# Patient Record
Sex: FEMALE
[De-identification: names, ages, dates, MRNs, and addresses within clinical notes are randomized per-mention and may not be internally consistent; named-entity substitution may affect disease eponyms.]

---

## 2018-01-09 ENCOUNTER — HOSPITAL ENCOUNTER (EMERGENCY)
Dept: HOSPITAL 5 - ED | Age: 53
End: 2018-01-09
Payer: COMMERCIAL

## 2018-01-09 ENCOUNTER — HOSPITAL ENCOUNTER (INPATIENT)
Dept: HOSPITAL 5 - ED | Age: 53
LOS: 2 days | Discharge: HOME | DRG: 206 | End: 2018-01-11
Attending: INTERNAL MEDICINE | Admitting: INTERNAL MEDICINE
Payer: COMMERCIAL

## 2018-01-09 DIAGNOSIS — R55: ICD-10-CM

## 2018-01-09 DIAGNOSIS — Z98.84: ICD-10-CM

## 2018-01-09 DIAGNOSIS — M94.0: Primary | ICD-10-CM

## 2018-01-09 DIAGNOSIS — I10: ICD-10-CM

## 2018-01-09 DIAGNOSIS — E11.9: ICD-10-CM

## 2018-01-09 DIAGNOSIS — R07.9: Primary | ICD-10-CM

## 2018-01-09 DIAGNOSIS — E78.00: ICD-10-CM

## 2018-01-09 DIAGNOSIS — Z53.21: ICD-10-CM

## 2018-01-09 LAB
APTT BLD: 26.4 SEC. (ref 24.2–36.6)
BASOPHILS # (AUTO): 0 K/MM3 (ref 0–0.1)
BASOPHILS NFR BLD AUTO: 0.3 % (ref 0–1.8)
BUN SERPL-MCNC: 17 MG/DL (ref 7–17)
BUN/CREAT SERPL: 17 %
CALCIUM SERPL-MCNC: 9.6 MG/DL (ref 8.4–10.2)
EOSINOPHIL # BLD AUTO: 0 K/MM3 (ref 0–0.4)
EOSINOPHIL NFR BLD AUTO: 0.2 % (ref 0–4.3)
HCT VFR BLD CALC: 44.5 % (ref 30.3–42.9)
HEMOLYSIS INDEX: 9
HGB BLD-MCNC: 15.3 GM/DL (ref 10.1–14.3)
INR PPP: 1.02 (ref 0.87–1.13)
LYMPHOCYTES # BLD AUTO: 2.5 K/MM3 (ref 1.2–5.4)
LYMPHOCYTES NFR BLD AUTO: 19 % (ref 13.4–35)
MAGNESIUM SERPL-MCNC: 2.1 MG/DL (ref 1.7–2.3)
MCH RBC QN AUTO: 29 PG (ref 28–32)
MCHC RBC AUTO-ENTMCNC: 34 % (ref 30–34)
MCV RBC AUTO: 85 FL (ref 79–97)
MONOCYTES # (AUTO): 0.8 K/MM3 (ref 0–0.8)
MONOCYTES % (AUTO): 6 % (ref 0–7.3)
PLATELET # BLD: 180 K/MM3 (ref 140–440)
RBC # BLD AUTO: 5.21 M/MM3 (ref 3.65–5.03)

## 2018-01-09 PROCEDURE — 85610 PROTHROMBIN TIME: CPT

## 2018-01-09 PROCEDURE — 85025 COMPLETE CBC W/AUTO DIFF WBC: CPT

## 2018-01-09 PROCEDURE — 78452 HT MUSCLE IMAGE SPECT MULT: CPT

## 2018-01-09 PROCEDURE — A9502 TC99M TETROFOSMIN: HCPCS

## 2018-01-09 PROCEDURE — 82550 ASSAY OF CK (CPK): CPT

## 2018-01-09 PROCEDURE — 93005 ELECTROCARDIOGRAM TRACING: CPT

## 2018-01-09 PROCEDURE — 71046 X-RAY EXAM CHEST 2 VIEWS: CPT

## 2018-01-09 PROCEDURE — 93010 ELECTROCARDIOGRAM REPORT: CPT

## 2018-01-09 PROCEDURE — 93017 CV STRESS TEST TRACING ONLY: CPT

## 2018-01-09 PROCEDURE — 36415 COLL VENOUS BLD VENIPUNCTURE: CPT

## 2018-01-09 PROCEDURE — 93306 TTE W/DOPPLER COMPLETE: CPT

## 2018-01-09 PROCEDURE — 83735 ASSAY OF MAGNESIUM: CPT

## 2018-01-09 PROCEDURE — 85379 FIBRIN DEGRADATION QUANT: CPT

## 2018-01-09 PROCEDURE — 80048 BASIC METABOLIC PNL TOTAL CA: CPT

## 2018-01-09 PROCEDURE — 85730 THROMBOPLASTIN TIME PARTIAL: CPT

## 2018-01-09 PROCEDURE — 84484 ASSAY OF TROPONIN QUANT: CPT

## 2018-01-09 PROCEDURE — 82553 CREATINE MB FRACTION: CPT

## 2018-01-09 NOTE — XRAY REPORT
FINAL REPORT



PROCEDURE:  Chest. 



TECHNIQUE:  PA and lateral views.



HISTORY:  Chest pain. 



COMPARISON:  No prior studies are available for comparison.



FINDINGS:  

The heart size is normal. There is mild tortuosity of the

thoracic aorta. The lungs are clear and well expanded. There are

no pleural effusions. The soft tissues and regional skeleton are

unremarkable.



IMPRESSION:  

No evidence of acute disease.

## 2018-01-09 NOTE — EMERGENCY DEPARTMENT REPORT
ED Chest Pain HPI





- General


Chief Complaint: Chest Pain


Stated Complaint: UNK


Time Seen by Provider: 01/09/18 21:17


Source: patient, RN notes reviewed


Mode of arrival: Ambulatory


Limitations: No Limitations





- History of Present Illness


Initial Comments: 





This is a 52-year-old female, the patient is previously unknown to this provider

, her primary care doctor's with the [employer] at work, she endorses a past 

medical history of high cholesterol, diabetes, denies hypertension.  Patient 

presents to the ER with a complaint of chest tightness, diaphoresis, shortness 

of breath, lightheadedness, near syncope.  Symptoms are intermittent for the 

past couple days.  The chest tightness does not radiate anywhere.  Patient 

reports recent road trip to [place].  Patient also works as a school bus 

.  No recent stress test.  no recent echocardiogram.


MD Complaint: chest pain


-: Gradual


Onset: during rest


Pain Location: substernal


Pain Radiation: none


Severity: mild


Severity scale (0 -10): 3


Quality: tightness, aching


Consistency: intermittent


Improves With: nothing


Worsens With: nothing


Context: recent travel


re: diaphoresis, dyspnea


Other Symptoms: palpitations.  denies: cough, fever, syncope, rash, acid taste 

in mouth, leg swelling


Aspirin use within the Past 7 Days: (1) Yes





- Related Data


On Oral Contraceptives: No


 Home Medications











 Medication  Instructions  Recorded  Confirmed  Last Taken


 


Hydrochlorothiazide [HCTZ] 25 mg PO QDAY 01/09/18 01/09/18 Unknown


 


Oxybutynin Chloride [Oxybutynin 5 mg PO QDAY 01/09/18 01/09/18 Unknown





Chloride ER]    











 Allergies











Allergy/AdvReac Type Severity Reaction Status Date / Time


 


ibuprofen [From Motrin] Allergy  Rash Verified 01/09/18 06:58














Heart Score





- HEART Score


History: Moderately suspicious


EKG: Normal


Age: 45-65


Risk factors: 1-2 risk factors


Troponin: < normal limit


HEART Score: 3





- Critical Actions


Critical Actions: 0-3 pts:0.9-1.7%risk of adverse cardiac event.Candidate for 

discharge





ED Review of Systems


ROS: 


Stated complaint: UNK


Other details as noted in HPI





Constitutional: diaphoresis.  denies: fever


Eyes: denies: vision change


ENT: denies: epistaxis


Respiratory: denies: wheezing


Cardiovascular: chest pain, palpitations


Gastrointestinal: as per HPI


Genitourinary: as per HPI


Musculoskeletal: as per HPI


Skin: as per HPI


Neurological: as per HPI, weakness


Psychiatric: as per HPI





ED Past Medical Hx





- Past Medical History


Hx Hypertension: Yes


Hx Diabetes: Yes (history of diabetes resolved after gastric bypass)


Hx Headaches / Migraines: Yes


Additional medical history: Hypercholesterolemia





- Surgical History


Additional Surgical History: gastric by pass, hysterectomy





- Social History


Smoking Status: Never Smoker


Substance Use Type: None





- Medications


Home Medications: 


 Home Medications











 Medication  Instructions  Recorded  Confirmed  Last Taken  Type


 


Hydrochlorothiazide [HCTZ] 25 mg PO QDAY 01/09/18 01/09/18 Unknown History


 


Oxybutynin Chloride [Oxybutynin 5 mg PO QDAY 01/09/18 01/09/18 Unknown History





Chloride ER]     














ED Physical Exam





- General


Limitations: No Limitations


General appearance: alert, in no apparent distress





- Head


Head exam: Present: atraumatic, normocephalic





- Eye


Eye exam: Present: normal appearance, PERRL, EOMI, other (visual acuity intact 

to finger counting, color perception, reading at a close distance).  Absent: 

nystagmus





- ENT


ENT exam: Present: normal exam, normal orophraynx, mucous membranes moist, 

normal external ear exam





- Neck


Neck exam: Present: normal inspection, full ROM





- Respiratory


Respiratory exam: Present: normal lung sounds bilaterally.  Absent: respiratory 

distress





- Cardiovascular


Cardiovascular Exam: Present: regular rate, normal rhythm, normal heart sounds.

  Absent: systolic murmur, diastolic murmur, rubs, gallop





- GI/Abdominal


GI/Abdominal exam: Present: soft, normal bowel sounds.  Absent: distended, 

tenderness, guarding, rebound, rigid, pulsatile mass





- Extremities Exam


Extremities exam: Present: normal inspection, full ROM, normal capillary 

refill.  Absent: pedal edema, joint swelling, calf tenderness





- Back Exam


Back exam: Present: normal inspection, full ROM.  Absent: tenderness, CVA 

tenderness (R), paraspinal tenderness, vertebral tenderness





- Neurological Exam


Neurological exam: Present: alert, oriented X3, CN II-XII intact, normal gait (

no pass pointing.  Normal heel-to-shin.  Negative Romberg.  Normal gait.  

Normal tandem gait), other (Extraocular movements intact.  Tongue midline.  No 

facial droop.  Facial sensation intact to light touch in the V1, V2, V3 

distribution bilaterally.  5 and 5 strength in 4 extremities..  Sensation is 

intact to light touch in 4 extremities.).  Absent: motor sensory deficit





- Psychiatric


Psychiatric exam: Present: normal affect, normal mood





- Skin


Skin exam: Present: warm, dry, intact, normal color.  Absent: rash





ED Course


 Vital Signs











  01/09/18 01/09/18 01/09/18





  19:07 21:35 22:00


 


Temperature 98.5 F 98.3 F 


 


Pulse Rate 69 86 64


 


Respiratory 18 18 10 L





Rate   


 


Blood Pressure 129/88  121/86


 


Blood Pressure  126/82 





[Left]   


 


O2 Sat by Pulse 96 99 100





Oximetry   














  01/09/18 01/09/18 01/09/18





  22:30 22:31 23:00


 


Temperature   


 


Pulse Rate 72 74 86


 


Respiratory 13 13 18





Rate   


 


Blood Pressure 129/87 129/87 126/91


 


Blood Pressure   





[Left]   


 


O2 Sat by Pulse 98 99 98





Oximetry   














  01/10/18 01/10/18





  00:00 01:00


 


Temperature  


 


Pulse Rate 82 84


 


Respiratory 18 15





Rate  


 


Blood Pressure 137/82 105/68


 


Blood Pressure  





[Left]  


 


O2 Sat by Pulse 98 98





Oximetry  














MAYRA score





- Mayra Score


Age > 65: (0) No


Aspirin use within the Past 7 Days: (1) Yes


3 or more CAD Risk Factors: (0) No


2 or more Angina events in past 24 hrs: (1) Yes


Known CAD with more than 50% Stenosis: (0) No


Elevated Cardiac Markers: (0) No


ST Deviation Greater than 0.5mm: (0) No


MAYRA Score: 2





ED Medical Decision Making





- Lab Data








 Vital Signs - 24 hr











  01/09/18 01/09/18 01/09/18





  19:07 22:00 22:30


 


Temperature 98.5 F  


 


Pulse Rate 69 64 72


 


Respiratory 18 10 L 13





Rate   


 


Blood Pressure 129/88 121/86 129/87


 


O2 Sat by Pulse 96 100 98





Oximetry   














 Lab Results











  01/09/18 01/09/18 Range/Units





  21:59 21:59 


 


PT  13.9   (12.2-14.9)  Sec.


 


INR  1.02   (0.87-1.13)  


 


APTT  26.4   (24.2-36.6)  Sec.


 


D-Dimer  149.91   (0-234)  ng/mlDDU


 


Magnesium   2.10  (1.7-2.3)  mg/dL


 


Troponin T   < 0.010  (0.00-0.029)  ng/mL














- EKG Data


-: EKG Interpreted by Me





- EKG Data





01/09/18 22:47


Sinus, 64 bpm, normal axis, normal intervals, morphological consistent with 

STEMI.  Prior to EKG is also unremarkable.





- Radiology Data


Radiology results: image reviewed


interpreted by me: 





X-ray of the chest, interpreted by myself: No acute disease





- Medical Decision Making





Differential diagnosis, including without limited to, orthostasis, vagal event, 

acute coronary syndrome, pneumonia, pulmonary embolus





Assessment and plan: 52-year-old female, low risk by well's criteria, with a 

negative d-dimer, with chest tightness, shortness of breath, diaphoresis and 

near syncope. 











I contacted the [facility] and discussed the case with their physician, Dr. Brunilda Muniz.  She indicates that they cannot accommodate the patient closely 

as an outpatient for cardiac risk stratification, and she authorized the 

patient to be admitted to this hospital for cardiac risk stratification.  She 

does report that the patient had a cardiac Holter monitor acquired in October 

which demonstrated a number of PVCs.  Case was presented to the Hospital 

physician, Dr. Stacy, who accepted the patient to the medical service for 

cardiac risk stratification.


Critical care attestation.: 


If time is entered above; I have spent that time in minutes in the direct care 

of this critically ill patient, excluding procedure time.








ED Disposition


Clinical Impression: 


 Chest pain, Near syncope





Disposition: DC-09 OP ADMIT IP TO THIS HOSP


Is pt being admited?: Yes


Does the pt Need Aspirin: Yes


Condition: Good

## 2018-01-10 RX ADMIN — HEPARIN SODIUM SCH UNIT: 5000 INJECTION, SOLUTION INTRAVENOUS; SUBCUTANEOUS at 22:59

## 2018-01-10 RX ADMIN — ASPIRIN SCH MG: 325 TABLET ORAL at 13:24

## 2018-01-10 RX ADMIN — NITROGLYCERIN SCH: 20 OINTMENT TOPICAL at 01:40

## 2018-01-10 RX ADMIN — NITROGLYCERIN SCH: 20 OINTMENT TOPICAL at 13:26

## 2018-01-10 RX ADMIN — NITROGLYCERIN SCH INCH: 20 OINTMENT TOPICAL at 06:11

## 2018-01-10 RX ADMIN — HEPARIN SODIUM SCH UNIT: 5000 INJECTION, SOLUTION INTRAVENOUS; SUBCUTANEOUS at 13:24

## 2018-01-10 RX ADMIN — NITROGLYCERIN SCH: 20 OINTMENT TOPICAL at 14:00

## 2018-01-10 NOTE — HISTORY AND PHYSICAL REPORT
CHIEF COMPLAINT:  Chest pain.  Other complaint includes irregular heartbeat,

which she described as fluttering.



HISTORY OF PRESENT ILLNESS:  The patient is a 52-year-old female, who states she

has been having chest pain going on for about 4 days.  The pain is in the

retrosternal area and appears as tightness in the chest pain and does not

radiate and is associated with shortness of breath, lightheadedness, and near

syncopal feeling.  Also, the patient said that there was symptom of nausea, but

no vomiting and also describes some fluttering sensation in the chest going on

for quite some time prior to the chest pain and continued with the chest pain. 

There is no history of fever, no history of cough, and no history of

diaphoresis.  The patient was seen in the Emergency Room.



PAST MEDICAL HISTORY:  Pertinent for diabetes mellitus, diet controlled; also

the patient has past history of migraine headache, hypercholesterolemia.



PAST SURGICAL HISTORY:  Pertinent for gastric bypass surgery and hysterectomy.



FAMILY HISTORY:  Pertinent for coronary artery disease in the mother.



SOCIAL HISTORY:  The patient does not smoke, does not drink alcohol and does not

use illicit drugs.



MEDICATIONS:  The patient is on Pepcid 20 mg by mouth daily and Norco 5/325 mg 1

by mouth every 6 hours as needed for pain.



ALLERGIES:  THE PATIENT IS ALLERGIC TO IBUPROFEN.



REVIEW OF SYSTEMS:

CONSTITUTIONAL:  There is no fever, no chills, no diaphoresis.

HEENT:  There is no headache or sore throat.

CARDIOVASCULAR SYSTEM:  Chest pain is present.  No orthopnea.  Palpitation.

RESPIRATORY SYSTEM:  Shortness of breath is present.  No cough.

GASTROINTESTINAL SYSTEM:  There is nausea but no vomiting.  No abdominal pain,

diarrhea or constipation.

NEUROLOGICAL SYSTEM:  There is dizziness and near syncopal feeling with no

change in mental status.

MUSCULOSKELETAL SYSTEM:  There is no joint pain or swelling.

DERMATOLOGICAL SYSTEM:  There is no skin rash or itching.

GENITOURINARY SYSTEM:  There are no dysuria, hematuria or flank pain.  Rest of

system review is normal.



PHYSICAL EXAMINATION:

GENERAL:  At the time of exam, the patient was found to be alert, oriented x 3,

and not in acute distress.

VITAL SIGNS:  Shows normal temperature with pulse of 82, respiration of 18,

blood pressure 137/82, and O2 sat of 98% on room air.

HEENT:  Showed pupils to be equal, round, and reactive to light and

accommodative.  Extraocular muscles are intact.

NECK:  Supple with no JVD or carotid bruit.

CARDIOVASCULAR:  Showed normal first and second heart sounds with some skipped

beats, with no murmurs.

RESPIRATORY SYSTEM:  Show good air entry on both sides of the lung with no

abnormal breath sounds.

GASTROINTESTINAL SYSTEM:  Show abdomen to be full, soft, nontender with no

organomegaly or rigidity.

NEUROLOGIC:  Shows no focal deficit.

MUSCULOSKELETAL SYSTEM:  Show no joint swelling or tenderness.

DERMATOLOGICAL SYSTEM:  Show no skin rash.

GENITOURINARY SYSTEM:  Showing no costovertebral angle tenderness.



PERTINENT LABORATORY DATA AND IMAGING STUDIES:  The patient had a chest x-ray

done that shows no evidence of cardiopulmonary lesion.  The patient's lab tests

show unremarkable cardiac enzymes and normal lab results.



DIAGNOSES:

1.  Chest pain.

2.  Irregular heartbeat.



PLAN:  The patient will be admitted as inpatient to telemetry and we would have

cardiac enzymes checked q.6 hours x 2 more levels.  The patient will be n.p.o.

for Lexiscan stress test in the morning and will have 2D echo done in the

morning to also check the heart for irregular heartbeat.  The patient will be on

nitro paste 1 inch to anterior chest wall q.6 hours and will be on aspirin 325

mg by mouth daily and Tylenol 650 mg by mouth every _____ for fever and

headache.  The patient will be on sublingual nitroglycerin 0.4 mg q.5 minutes

for breakthrough chest pain.  The patient will be on IV morphine 2 mg every 5

minutes as needed for chest pain and she would be on aspirin 325 mg by mouth

daily.  The patient will also be on oxygen by nasal cannula at 2 liter per

minute.  The patient's home medications will be reconciled and started

accordingly.





DD: 01/10/2018 02:14

DT: 01/10/2018 03:05

JOB# [id number]  [id number]

OCN/NTS

## 2018-01-10 NOTE — PROGRESS NOTE
Assessment and Plan


Assessment and plan: 





Chest pain.  Continued chest pain pathway and follow-up stress thallium and 

echocardiogram results.





Hypertension.  Resume antihypertensive medications.





Hypercholesterolemia.





History


Interval history: 





Patient seen in the stress lab.  Patient currently denies any chest pain, 

shortness of breath.





Hospitalist Physical





- Constitutional


Vitals: 


 











Temp Pulse Resp BP Pulse Ox


 


 98.1 F   70   20   109/76   97 


 


 01/10/18 06:06  01/10/18 06:11  01/10/18 06:06  01/10/18 06:11  01/10/18 06:06











General appearance: Present: no acute distress, well-nourished





- EENT


Eyes: Present: PERRL, EOM intact


ENT: hearing intact, clear oral mucosa, dentition normal





- Neck


Neck: Present: supple, normal ROM





- Respiratory


Respiratory effort: normal


Respiratory: bilateral: CTA





- Cardiovascular


Rhythm: regular


Heart Sounds: Present: S1 & S2.  Absent: gallop, rub





- Extremities


Extremities: no ischemia, No edema, Full ROM





- Abdominal


General gastrointestinal: soft, non-tender, non-distended, normal bowel sounds





- Integumentary


Integumentary: Present: clear, warm, dry





- Neurologic


Neurologic: CNII-XII intact, moves all extremities





Results





- Labs


Labs: 


 Laboratory Last Values











PT  13.9 Sec. (12.2-14.9)   01/09/18  21:59    


 


INR  1.02  (0.87-1.13)   01/09/18  21:59    


 


APTT  26.4 Sec. (24.2-36.6)   01/09/18  21:59    


 


D-Dimer  149.91 ng/mlDDU (0-234)   01/09/18  21:59    


 


Magnesium  2.10 mg/dL (1.7-2.3)   01/09/18  21:59    


 


Total Creatine Kinase  76 units/L ()   01/10/18  06:56    


 


CK-MB (CK-2)  1.2 ng/mL (0.0-4.0)   01/10/18  06:56    


 


CK-MB (CK-2) Rel Index  1.5  (0-4)   01/10/18  06:56    


 


Troponin T  < 0.010 ng/mL (0.00-0.029)   01/10/18  06:56

## 2018-01-11 VITALS — SYSTOLIC BLOOD PRESSURE: 113 MMHG | DIASTOLIC BLOOD PRESSURE: 79 MMHG

## 2018-01-11 RX ADMIN — NITROGLYCERIN SCH: 20 OINTMENT TOPICAL at 07:58

## 2018-01-11 RX ADMIN — HEPARIN SODIUM SCH: 5000 INJECTION, SOLUTION INTRAVENOUS; SUBCUTANEOUS at 11:11

## 2018-01-11 RX ADMIN — NITROGLYCERIN SCH: 20 OINTMENT TOPICAL at 14:03

## 2018-01-11 RX ADMIN — NITROGLYCERIN SCH: 20 OINTMENT TOPICAL at 11:11

## 2018-01-11 RX ADMIN — ASPIRIN SCH MG: 325 TABLET ORAL at 11:11

## 2018-01-11 NOTE — DISCHARGE SUMMARY
<MELI WESLEY - Last Filed: 01/11/18 14:58>





Providers





- Providers


Date of Admission: 


01/10/18 01:53





Date of discharge: 01/11/18


Attending physician: 


YOHANNES VO





Primary care physician: 


PUNEET JEFFRIES








Hospitalization


Reason for admission: Chest pain


Condition: Good


Hospital course: 


Patient is a 52-year-old female with past medical history of high cholesterol, 

diabetes, denies hypertension who, presents to the ER with a complaint of chest 

tightness. Patient was diagnosed with chest pain, hypertension and 

Hypercholesterolemia. Patient presented with atypical chest pain, ACS was ruled 

out, stress test normal MPI,  negative cardiac enzymes, ECGs shows normal sinus 

rythm, CXR WNL. Patient chest pain probably from musculoskeletal or gastritis. 

He was treated with IV fluid hydration and antihypertensive medications. 

Patient is clinically improved and stable for discharge.  Patient advised to 

follow-up with her primary care provider.





Discharge Diagnosed 





Chest Pain due to Costochondritis 


Hypertension


Hypercholesterolemia








Disposition: DC-01 TO HOME OR SELFCARE


Time spent for discharge: 33 minutes





Core Measure Documentation





- Palliative Care


Palliative Care/ Comfort Measures: Not Applicable





- Core Measures


Any of the following diagnoses?: none





Exam





- Constitutional


Vitals: 


 











Temp Pulse Resp BP Pulse Ox


 


 98.2 F   61   18   113/79   96 


 


 01/11/18 04:56  01/11/18 10:00  01/11/18 04:56  01/11/18 04:56  01/11/18 04:56











General appearance: Present: no acute distress





- EENT


Eyes: Present: PERRL


ENT: hearing intact





- Neck


Neck: Present: supple





- Respiratory


Respiratory effort: normal


Respiratory: bilateral: CTA





- Cardiovascular


Rhythm: regular


Heart Sounds: Present: S1 & S2





- Abdominal


General gastrointestinal: Present: soft, non-tender


Female genitourinary: Present: deferred





- Rectal


Rectal Exam: deferred





- Integumentary


Integumentary: Present: clear, warm, dry





- Musculoskeletal


Musculoskeletal: strength equal bilaterally





- Psychiatric


Psychiatric: appropriate mood/affect





- Neurologic


Neurologic: moves all extremities





- Allied Health


Allied health notes reviewed: nursing





Plan


Diet: low fat, low cholesterol, low salt


Follow up with: 


PUNEET JEFFRIES MD [Primary Care Provider] - 3-5 Days


Forms:  Discharge Signature Page, Work/School Release Form


Prescriptions: 


Hydrochlorothiazide [HCTZ] 25 mg PO QDAY #30 tablet


Oxybutynin Xl [Ditropan Xl] 5 mg PO QDAY 30 Days  tablet





<YOHANNES VO - Last Filed: 01/15/18 10:10>





Providers





- Providers


Date of Admission: 


01/10/18 01:53





Attending physician: 


YOHANNES VO





Primary care physician: 


PUNEET JEFFRIES








Hospitalization


Hospital course: 





I saw and evaluated the patient. I agree with the findings and the plan of care 

as documented in the Nurse Practitioner's~note, with the following corrections 

and additions.








Exam





- Constitutional


Vitals: 


 











Temp Pulse Resp BP Pulse Ox


 


 98.2 F   61   18   113/79   96 


 


 01/11/18 04:56  01/11/18 10:00  01/11/18 04:56  01/11/18 04:56  01/11/18 04:56

## 2018-01-11 NOTE — TREADMILL REPORT
ORDERING PHYSICIAN:  Dr. Isai Landry.



INDICATION FOR THE PROCEDURE:  Chest pain.



FINDINGS:  There is no scintigraphic evidence of myocardial ischemia.  The left

ventricle is normal in size and systolic function.  Left ventricular ejection

fraction is measured at 60%.  Normal wall motion and wall thickening is noted on

gated imaging.



CONCLUSION:  Normal perfusion scan.





DD: 01/10/2018 12:39

DT: 01/11/2018 02:13

JOB# [id number]  [id number]

RENA/JAGDEEP